# Patient Record
(demographics unavailable — no encounter records)

---

## 2025-05-21 NOTE — HISTORY OF PRESENT ILLNESS
[Mother] : mother [Fruit] : fruit [Vegetables] : vegetables [Dairy] : dairy [Toilet Trained] :  toilet trained [Normal] : Normal [Brushing teeth] : Brushing teeth [Yes] : Patient goes to dentist yearly [Vitamin] : Primary Fluoride Source: Vitamin [Playtime (60 min/d)] : Playtime 60 min a day [In Pre-K] : In Pre-K [No] : No cigarette smoke exposure [Car seat in back seat] : Car seat in back seat [Up to date] : Up to date [FreeTextEntry9] : soccer

## 2025-05-21 NOTE — PHYSICAL EXAM
[Alert] : alert [No Acute Distress] : no acute distress [Playful] : playful [Normocephalic] : normocephalic [Conjunctivae with no discharge] : conjunctivae with no discharge [PERRL] : PERRL [EOMI Bilateral] : EOMI bilateral [Auricles Well Formed] : auricles well formed [Clear Tympanic membranes with present light reflex and bony landmarks] : clear tympanic membranes with present light reflex and bony landmarks [No Discharge] : no discharge [Nares Patent] : nares patent [Pink Nasal Mucosa] : pink nasal mucosa [Palate Intact] : palate intact [Uvula Midline] : uvula midline [Nonerythematous Oropharynx] : nonerythematous oropharynx [No Caries] : no caries [Trachea Midline] : trachea midline [Supple, full passive range of motion] : supple, full passive range of motion [No Palpable Masses] : no palpable masses [Symmetric Chest Rise] : symmetric chest rise [Clear to Auscultation Bilaterally] : clear to auscultation bilaterally [Normoactive Precordium] : normoactive precordium [Regular Rate and Rhythm] : regular rate and rhythm [Normal S1, S2 present] : normal S1, S2 present [No Murmurs] : no murmurs [+2 Femoral Pulses] : +2 femoral pulses [Soft] : soft [NonTender] : non tender [Non Distended] : non distended [Normoactive Bowel Sounds] : normoactive bowel sounds [No Hepatomegaly] : no hepatomegaly [No Splenomegaly] : no splenomegaly [Lane 1] : Lane 1 [Central Urethral Opening] : central urethral opening [Testicles Descended Bilaterally] : testicles descended bilaterally [Patent] : patent [Normally Placed] : normally placed [No Abnormal Lymph Nodes Palpated] : no abnormal lymph nodes palpated [Symmetric Buttocks Creases] : symmetric buttocks creases [Symmetric Hip Rotation] : symmetric hip rotation [No Gait Asymmetry] : no gait asymmetry [No pain or deformities with palpation of bone, muscles, joints] : no pain or deformities with palpation of bone, muscles, joints [Normal Muscle Tone] : normal muscle tone [No Spinal Dimple] : no spinal dimple [NoTuft of Hair] : no tuft of hair [Straight] : straight [Cranial Nerves Grossly Intact] : cranial nerves grossly intact [No Rash or Lesions] : no rash or lesions

## 2025-05-21 NOTE — DISCUSSION/SUMMARY
[School Readiness] : school readiness [Mental Health] : mental health [Nutrition and Physical Activity] : nutrition and physical activity [Oral Health] : oral health [Safety] : safety [Full Activity without restrictions including Physical Education & Athletics] : Full Activity without restrictions including Physical Education & Athletics [FreeTextEntry1] : ESTRELLITA is a 5 year old M here for well child visit. He takes no medications. Physical exam today was normal. Healthy eating 5/2/1/0 discussed Mom concerned about loud snoring at night, he also has overactive gag reflex and throws up 1-2 x per week. ENT referral provided. Return for flu shot in the fall and then for next routine well visit.

## 2025-05-23 NOTE — PHYSICAL EXAM
[Exposed Vessel] : left anterior vessel not exposed [1+] : 1+ [Normal] : normal [Wheezing] : no wheezing [Increased Work of Breathing] : no increased work of breathing with use of accessory muscles and retractions [Normal Gait and Station] : normal gait and station [Normal muscle strength, symmetry and tone of facial, head and neck musculature] : normal muscle strength, symmetry and tone of facial, head and neck musculature [de-identified] : OVERWEIGHT [de-identified] : PATENT NASAL AIRWAYS

## 2025-05-23 NOTE — ASSESSMENT
[FreeTextEntry1] : CONSERVATIVE MANAGEMENT NO INDICATION FOR ADENOIDECTOMY OR TONSILLECTOMY AT PRESENT F/U PRN

## 2025-05-23 NOTE — REVIEW OF SYSTEMS
[Lightheadedness] : lightheadedness [Nasal Congestion] : nasal congestion [Problem Snoring] : problem snoring [Sinus Pain] : sinus pain [Sinus Pressure] : sinus pressure [Snoring With Pauses] : snoring with pauses [Hoarseness] : hoarseness [Throat Pain] : throat pain [Throat Itching] : throat itching [Throat Clearing] : throat clearing [Throat Dryness] : throat dryness [Heartburn] : heartburn [Negative] : Heme/Lymph [FreeTextEntry6] : noisy breathing   [FreeTextEntry7] : reflux

## 2025-06-18 NOTE — HISTORY OF PRESENT ILLNESS
[de-identified] : 5 year old male presents for evaluation of emesis, NB/NB.  Complaints of heartburn with lying flat a few times a week. Eats dinner at 530pm and bed time is 730pm. Eats a variety of foods and textures. Emesis can occur at any time of day, 4-5 times a week, less for the last week, NB/NB, food contents. Abdominal pian after vomiting. Coughs twice usually prior to emesis. Seemingly a mouth breather, often sounds congested. Snores at night. BM are daily, [place] 3-6, no visible blood or mucous. No recurrent fevers or illnesses, weight loss, rashes, mouth ulcers, joint pains.

## 2025-06-18 NOTE — CONSULT LETTER
[Dear  ___] : Dear  [unfilled], [Consult Letter:] : I had the pleasure of evaluating your patient, [unfilled]. [Please see my note below.] : Please see my note below. [Consult Closing:] : Thank you very much for allowing me to participate in the care of this patient.  If you have any questions, please do not hesitate to contact me. [Sincerely,] : Sincerely, [FreeTextEntry3] : Reggie José DO, MSc   of Comprehensive Airway, Respiratory and Esophageal Team Division of Pediatric Gastroenterology, Liver Disease and Nutrition [facility]

## 2025-06-18 NOTE — PHYSICAL EXAM
[Well Developed] : well developed [NAD] : in no acute distress [Alert and Active] : alert and active [icteric] : anicteric [Moist & Pink Mucous Membranes] : moist and pink mucous membranes [CTAB] : lungs clear to auscultation bilaterally [Respiratory Distress] : no respiratory distress  [Regular Rate and Rhythm] : regular rate and rhythm [Normal S1, S2] : normal S1 and S2 [Soft] : soft  [Distended] : non distended [Tender] : non tender [Normal Bowel Sounds] : normal bowel sounds [Stool Palpable] : stool palpable [No HSM] : no hepatosplenomegaly appreciated [No Back Lesion] : no back lesion [Normal Tone] : normal tone [Well-Perfused] : well-perfused [Edema] : no edema [Cyanosis] : no cyanosis [Rash] : no rash [Jaundice] : no jaundice [Interactive] : interactive

## 2025-06-18 NOTE — ASSESSMENT
[FreeTextEntry1] : 5 year old male presents for evaluation of emesis, NB/NB.  Recommend: -Daily probiotics and benefiber, monitor stools, consider MiraLAX daily if stools are more constipated type -JERRELL precautions -Screening labs -Call in 1 week to discuss clinical progression/labs, sooner with questions, concerns, or clinical change -Follow-up in 6 weeks -ENT second opinion as planned

## 2025-06-18 NOTE — CONSULT LETTER
[Dear  ___] : Dear  [unfilled], [Consult Letter:] : I had the pleasure of evaluating your patient, [unfilled]. [Please see my note below.] : Please see my note below. [Consult Closing:] : Thank you very much for allowing me to participate in the care of this patient.  If you have any questions, please do not hesitate to contact me. [Sincerely,] : Sincerely, [FreeTextEntry3] : Reggie José DO, MSc   of Comprehensive Airway, Respiratory and Esophageal Team Division of Pediatric Gastroenterology, Liver Disease and Nutrition [facility]

## 2025-06-26 NOTE — CONSULT LETTER
[Dear  ___] : Dear  [unfilled], [Please see my note below.] : Please see my note below. [Consult Closing:] : Thank you very much for allowing me to participate in the care of this patient.  If you have any questions, please do not hesitate to contact me. [Sincerely,] : Sincerely, [Courtesy Letter:] : I had the pleasure of seeing your patient, [unfilled], in my office today. [FreeTextEntry2] : Reggie José, DO [address] [facility] [address], [place] [address]  < 1 mi [phone] [FreeTextEntry3] : Laz Michael MD, PhD Chief, Division of Laryngology Department of Otolaryngology [facility] Pediatric Otolaryngology, [facility]  of Otolaryngology [facility]

## 2025-06-26 NOTE — HISTORY OF PRESENT ILLNESS
[de-identified] : 5 year old male referred here by Dr. José (GI) for concern for enlarged adenoids. Mom reports snoring throughout his life, mouth breathing with pauses and gasping for air. Denies witnessed apneas. Reports frequent awakenings and very restless sleep. Reports chronic nasal congestion. Denies any use of saline or steroidal nasal sprays.  Denies previous PSG. Denies daytime tiredness, bedwetting, and focusing/behavorial concerns.  Denies any concerns with speech or hearing. Denies ear infections. Recently saw Dr. Ferguson in May 2025 who recommended waiting it out, here for second opinion now. Denies any recurrent sinus, ear, or throat infections. Mom states that he gets sore throats. Denies dysphagia. Mom reports that he gets SOB with exercise. Passed  hearing test. No Family History of easy bruising, bleeding, or anesthesia complications.

## 2025-06-26 NOTE — PHYSICAL EXAM
[Clear to Auscultation] : lungs were clear to auscultation bilaterally [Normal Gait and Station] : normal gait and station [Normal muscle strength, symmetry and tone of facial, head and neck musculature] : normal muscle strength, symmetry and tone of facial, head and neck musculature [Normal] : no cervical lymphadenopathy [3+] : 3+ [Effusion] : no effusion [Exposed Vessel] : left anterior vessel not exposed [Wheezing] : no wheezing [Increased Work of Breathing] : no increased work of breathing with use of accessory muscles and retractions

## 2025-06-26 NOTE — REVIEW OF SYSTEMS
Quality 130: Documentation Of Current Medications In The Medical Record: Current Medications Documented Quality 131: Pain Assessment And Follow-Up: Pain assessment using a standardized tool is documented as negative, no follow-up plan required Detail Level: Detailed Quality 226: Preventive Care And Screening: Tobacco Use: Screening And Cessation Intervention: Patient screened for tobacco use and is an ex/non-smoker Quality 110: Preventive Care And Screening: Influenza Immunization: Influenza Immunization previously received during influenza season [Negative] : Heme/Lymph [de-identified] : as per HPI

## 2025-06-26 NOTE — ADDENDUM
[FreeTextEntry1] :   Documented by Mahamed Mukherjee acting as scribe for Dr. Michael on 06/26/2025. All Medical record entries made by the Scribe were at my, Dr. Michael, direction and personally dictated by me on 06/26/2025 . I have reviewed the chart and agree that the record accurately reflects my personal performance of the history, physical exam, assessment and plan. I have also personally directed, reviewed, and agreed with the discharge instructions.